# Patient Record
Sex: FEMALE | HISPANIC OR LATINO | Employment: FULL TIME | URBAN - METROPOLITAN AREA
[De-identification: names, ages, dates, MRNs, and addresses within clinical notes are randomized per-mention and may not be internally consistent; named-entity substitution may affect disease eponyms.]

---

## 2021-10-22 ENCOUNTER — OFFICE VISIT (OUTPATIENT)
Dept: OBSTETRICS AND GYNECOLOGY | Facility: CLINIC | Age: 36
End: 2021-10-22

## 2021-10-22 VITALS
HEIGHT: 64 IN | WEIGHT: 157 LBS | SYSTOLIC BLOOD PRESSURE: 115 MMHG | BODY MASS INDEX: 26.8 KG/M2 | DIASTOLIC BLOOD PRESSURE: 73 MMHG

## 2021-10-22 DIAGNOSIS — N89.8 VAGINAL IRRITATION: Primary | ICD-10-CM

## 2021-10-22 DIAGNOSIS — Z23 NEED FOR HPV VACCINE: ICD-10-CM

## 2021-10-22 PROBLEM — E03.9 HYPOTHYROIDISM: Status: ACTIVE | Noted: 2019-11-21

## 2021-10-22 PROCEDURE — 90471 IMMUNIZATION ADMIN: CPT | Performed by: OBSTETRICS & GYNECOLOGY

## 2021-10-22 PROCEDURE — 90651 9VHPV VACCINE 2/3 DOSE IM: CPT | Performed by: OBSTETRICS & GYNECOLOGY

## 2021-10-22 PROCEDURE — 99203 OFFICE O/P NEW LOW 30 MIN: CPT | Performed by: OBSTETRICS & GYNECOLOGY

## 2021-10-22 RX ORDER — LEVOTHYROXINE SODIUM 112 UG/1
112 TABLET ORAL
COMMUNITY
Start: 2021-07-23 | End: 2021-12-09 | Stop reason: SDUPTHER

## 2021-10-22 RX ORDER — TRIAMCINOLONE ACETONIDE 1 MG/G
CREAM TOPICAL
COMMUNITY
Start: 2021-05-20

## 2021-10-22 RX ORDER — SPIRONOLACTONE 100 MG/1
TABLET, FILM COATED ORAL
COMMUNITY
Start: 2021-09-30

## 2021-10-22 RX ORDER — CLINDAMYCIN PHOSPHATE 10 UG/ML
LOTION TOPICAL
COMMUNITY
Start: 2021-09-17

## 2021-10-22 NOTE — PROGRESS NOTES
SUBJECTIVE:   Chief Complaint   Patient presents with   • Gynecologic Exam     New gyn, Pt states previous yeast infection, no longer having symtoms of burning or irritation, but now having dryness also dryness during intercourse         Yee Arnett is a 35 y.o.  who presents for vaginal irritation.  She reports that a couple weeks ago she treated herself for what she thought was a yeast infection with a 1 day Monistat.  Since then, she felt some irritation which she thinks may have been because the medication is too strong.  She saw her doctor a couple weeks ago and had negative testing.  She denies any new sexual partners.  Reports a history of a conization in [place] 12 to 13 years ago; thinks it was before her first pregnancy.  She reports a normal Pap smear in 2019 which she thinks was at [facility].  Denies any current vaginal discharge or irritation.  Denies any dysuria.    Reports her periods are more or less normal but sometimes irregular due to her thyroid disease. Tubal ligation for contraception    Patient was offered interpretor, but declined at this visit. She was counseled that she can notify us at any time if she prefers an interpretor and expressed agreement and understanding    Desires Gardasil vaccination      Past Medical History:   Diagnosis Date   • Hypothyroidism      Past Surgical History:   Procedure Laterality Date   • BLADDER SUSPENSION     •  SECTION       OB History    Para Term  AB Living   2 2 2         SAB IAB Ectopic Molar Multiple Live Births             2      # Outcome Date GA Lbr Freddie/2nd Weight Sex Delivery Anes PTL Lv   2 Term            1 Term               Social History     Tobacco Use   • Smoking status: Never Smoker   • Smokeless tobacco: Not on file   Substance Use Topics   • Alcohol use: Never   • Drug use: Never     Family History   Problem Relation Age of Onset   • Pancreatic cancer Mother    • Breast cancer Maternal  "Grandmother    • Breast cancer Maternal Aunt    • Colon cancer Maternal Uncle    • Ovarian cancer Maternal Aunt      Current Outpatient Medications on File Prior to Visit   Medication Sig Dispense Refill   • clindamycin (CLEOCIN T) 1 % lotion      • levothyroxine (SYNTHROID, LEVOTHROID) 112 MCG tablet Take 112 mcg by mouth.     • spironolactone (ALDACTONE) 100 MG tablet      • triamcinolone (KENALOG) 0.1 % cream APPLY CREAM EXTERNALLY TO AFFECTED AREA AT BEDTIME FOR 4 WEEKS THEN STOP FOR TWO WEEKS. MAY REPEAT. MIX WITH UREA.       No current facility-administered medications on file prior to visit.     No Known Allergies     Review of Systems  See HPI    OBJECTIVE:   Vitals:    10/22/21 0842   BP: 115/73   Weight: 71.2 kg (157 lb)   Height: 162.6 cm (64\")      Physical Exam  Exam conducted with a chaperone present.   Constitutional:       Appearance: She is well-developed.   HENT:      Head: Normocephalic and atraumatic.   Eyes:      General: No scleral icterus.  Cardiovascular:      Rate and Rhythm: Normal rate.   Pulmonary:      Effort: Pulmonary effort is normal. No respiratory distress.   Abdominal:      General: There is no distension.      Palpations: Abdomen is soft.      Tenderness: There is no abdominal tenderness. There is no guarding.   Genitourinary:     Labia:         Right: No rash, tenderness or lesion.         Left: No rash, tenderness or lesion.       Vagina: No vaginal discharge, bleeding or lesions.      Cervix: No cervical motion tenderness or friability.      Uterus: Normal. Not enlarged and not tender.       Adnexa: Right adnexa normal.        Right: No mass or tenderness.          Left: No mass or tenderness.     Musculoskeletal:      Cervical back: Normal range of motion.   Skin:     General: Skin is warm and dry.   Neurological:      Mental Status: She is alert and oriented to person, place, and time.   Psychiatric:         Behavior: Behavior normal.         ASSESSMENT/PLAN:     ICD-10-CM " ICD-9-CM   1. Vaginal irritation  N89.8 623.9   2. Need for HPV vaccine  Z23 V04.89       Point-of-care wet prep nondiagnostic, will send swab.  While awaiting testing, recommended vaginal moisturizers such as Replens or Franca to see if this improves symptoms is no obvious signs of infection at this time.  Discussed risks and benefits of HPV vaccine.  We discussed that it is approved for women ages 9 through 45.  She would like to receive this.  Will receive first vaccine today.  Recommended second dose in 2 months and third dose 4 months from second.       Orders Placed This Encounter   Procedures   • NuSwab BV & Candida - Swab, Vagina     Order Specific Question:   Release to patient     Answer:   Immediate   • HPV Vaccine       Return in about 1 month (around 11/22/2021) for Annual physical.

## 2021-10-26 LAB
A VAGINAE DNA VAG QL NAA+PROBE: NORMAL SCORE
BVAB2 DNA VAG QL NAA+PROBE: NORMAL SCORE
C ALBICANS DNA VAG QL NAA+PROBE: NEGATIVE
C GLABRATA DNA VAG QL NAA+PROBE: NEGATIVE
MEGA1 DNA VAG QL NAA+PROBE: NORMAL SCORE

## 2021-11-19 ENCOUNTER — TELEPHONE (OUTPATIENT)
Dept: OBSTETRICS AND GYNECOLOGY | Facility: CLINIC | Age: 36
End: 2021-11-19

## 2021-11-19 NOTE — TELEPHONE ENCOUNTER
----- Message from Juana Ibarra MD sent at 10/27/2021  8:08 AM EDT -----  Please call patient and let her know that her test results were normal.  I had recommended vaginal moisturizers for treatment (such as Luvena or Replens).  If she has any questions, I am happy to answer them. Thanks!    -pt is aware of negative results and name of treatments were given.

## 2021-12-09 ENCOUNTER — CLINICAL SUPPORT (OUTPATIENT)
Dept: OBSTETRICS AND GYNECOLOGY | Facility: CLINIC | Age: 36
End: 2021-12-09

## 2021-12-09 VITALS
DIASTOLIC BLOOD PRESSURE: 68 MMHG | HEIGHT: 64 IN | WEIGHT: 159 LBS | HEART RATE: 74 BPM | SYSTOLIC BLOOD PRESSURE: 106 MMHG | BODY MASS INDEX: 27.14 KG/M2

## 2021-12-09 DIAGNOSIS — Z23 NEED FOR HPV VACCINE: Primary | ICD-10-CM

## 2021-12-09 PROCEDURE — 90651 9VHPV VACCINE 2/3 DOSE IM: CPT | Performed by: OBSTETRICS & GYNECOLOGY

## 2021-12-09 PROCEDURE — 90471 IMMUNIZATION ADMIN: CPT | Performed by: OBSTETRICS & GYNECOLOGY

## 2021-12-09 RX ORDER — LEVOTHYROXINE SODIUM 112 UG/1
112 TABLET ORAL
COMMUNITY
Start: 2021-11-19 | End: 2022-05-18

## 2021-12-09 NOTE — PROGRESS NOTES
Subjective   Yee Arnett is a 36 y.o. female. Here for second gardasil injection. Office supplied injection. Lot # [id number] exp 8/15/2023 [id number]/01. Pt did not have a reaction    History of Present Illness        Review of Systems    Objective   Physical Exam    Assessment/Plan   There are no diagnoses linked to this encounter.

## 2022-01-14 ENCOUNTER — OFFICE VISIT (OUTPATIENT)
Dept: OBSTETRICS AND GYNECOLOGY | Facility: CLINIC | Age: 37
End: 2022-01-14

## 2022-01-14 ENCOUNTER — PATIENT MESSAGE (OUTPATIENT)
Dept: OBSTETRICS AND GYNECOLOGY | Facility: CLINIC | Age: 37
End: 2022-01-14

## 2022-01-14 VITALS
DIASTOLIC BLOOD PRESSURE: 72 MMHG | HEART RATE: 84 BPM | BODY MASS INDEX: 28.17 KG/M2 | SYSTOLIC BLOOD PRESSURE: 117 MMHG | HEIGHT: 64 IN | WEIGHT: 165 LBS

## 2022-01-14 DIAGNOSIS — N39.3 STRESS INCONTINENCE IN FEMALE: ICD-10-CM

## 2022-01-14 DIAGNOSIS — Z98.890 HISTORY OF SURGERY: ICD-10-CM

## 2022-01-14 DIAGNOSIS — Z01.419 WELL WOMAN EXAM WITH ROUTINE GYNECOLOGICAL EXAM: Primary | ICD-10-CM

## 2022-01-14 PROCEDURE — 99395 PREV VISIT EST AGE 18-39: CPT | Performed by: OBSTETRICS & GYNECOLOGY

## 2022-01-14 NOTE — PROGRESS NOTES
"Chief Complaint   Patient presents with   • Gynecologic Exam     Here for annual exam , last pap 2020        Yee Arnett is a 36 y.o.  who presents for an annual examination   Reports some increased urinary incontinence, mostly with stress. She had a surgery in 2016 in [place] with mesh. Will request records    Pap history:  Last pap:  NIL, HPV negative ([facility])  Prior abnormal paps: no  STDs  Sexually active: yes  History of STDs: no  Has had HPV vaccine: Yes, 2 of 3 doses  Contraception:  Tubal ligation- periods are a bit heavier since tubal. 4-5 days, second day changes pad about 5 times. Some small clots    Screening for BRCA-   Is patient's family history significant for BRCA risk factors? Difficult to discern. Reports in [place] was told \"cancer interior\" - difficult to say what exactly was primary    Past Medical History:   Diagnosis Date   • Hypothyroidism      Past Surgical History:   Procedure Laterality Date   • BLADDER SUSPENSION      [facility]   •  SECTION     • TUBAL ABDOMINAL LIGATION       OB History    Para Term  AB Living   2 2 2     2   SAB IAB Ectopic Molar Multiple Live Births             2      # Outcome Date GA Lbr Freddie/2nd Weight Sex Delivery Anes PTL Lv   2 Term 2018 38w0d  4309 g (9 lb 8 oz) F CS-Unspec   ANDRADE   1 Term  42w0d  2892 g (6 lb 6 oz) M    ANDRADE      Social History     Tobacco Use   • Smoking status: Never Smoker   • Smokeless tobacco: Never Used   Substance Use Topics   • Alcohol use: Never   • Drug use: Never     Family History   Problem Relation Age of Onset   • Pancreatic cancer Mother    • Breast cancer Maternal Grandmother 70   • Breast cancer Maternal Aunt    • Ovarian cancer Maternal Aunt    • Colon cancer Neg Hx    • Deep vein thrombosis Neg Hx    • Pulmonary embolism Neg Hx      Current Outpatient Medications on File Prior to Visit   Medication Sig Dispense Refill   • clindamycin (CLEOCIN T) 1 % lotion      • " "levothyroxine (SYNTHROID, LEVOTHROID) 112 MCG tablet Take 112 mcg by mouth.     • spironolactone (ALDACTONE) 100 MG tablet      • triamcinolone (KENALOG) 0.1 % cream APPLY CREAM EXTERNALLY TO AFFECTED AREA AT BEDTIME FOR 4 WEEKS THEN STOP FOR TWO WEEKS. MAY REPEAT. MIX WITH UREA.       No current facility-administered medications on file prior to visit.     No Known Allergies     Review of Systems  Se HPI    OBJECTIVE:   Vitals:    01/14/22 0859   BP: 117/72   Pulse: 84   Weight: 74.8 kg (165 lb)   Height: 162.6 cm (64\")      Physical Exam  Exam conducted with a chaperone present.   Constitutional:       General: She is not in acute distress.     Appearance: She is well-developed. She is not diaphoretic.   HENT:      Head: Normocephalic and atraumatic.   Neck:      Thyroid: No thyromegaly.      Trachea: No tracheal deviation.   Cardiovascular:      Rate and Rhythm: Normal rate.      Heart sounds: Normal heart sounds. No murmur heard.  No friction rub. No gallop.    Pulmonary:      Effort: Pulmonary effort is normal. No respiratory distress.      Breath sounds: Normal breath sounds.   Chest:      Chest wall: No tenderness.   Breasts:      Right: No inverted nipple, mass, nipple discharge, skin change or tenderness.      Left: No inverted nipple, mass, nipple discharge, skin change or tenderness.       Abdominal:      General: There is no distension.      Palpations: Abdomen is soft. There is no mass.      Tenderness: There is no abdominal tenderness.   Genitourinary:     General: Normal vulva.      Labia:         Right: No rash, lesion or injury.         Left: No rash, lesion or injury.       Vagina: No vaginal discharge, tenderness or bleeding.      Cervix: No cervical motion tenderness, discharge or friability.      Uterus: Not deviated, not enlarged, not fixed and not tender.       Adnexa:         Right: No mass, tenderness or fullness.          Left: No mass, tenderness or fullness.     Musculoskeletal:         " General: No deformity. Normal range of motion.   Lymphadenopathy:      Cervical: No cervical adenopathy.   Skin:     General: Skin is warm and dry.      Findings: No rash.   Neurological:      Mental Status: She is alert and oriented to person, place, and time.   Psychiatric:         Behavior: Behavior normal.         Thought Content: Thought content normal.         Judgment: Judgment normal.         ASSESSMENT/PLAN:     Annual well woman exam:  Cervical cancer screening:    Denies cervical dysplasia in past   HPV vaccination 2 of 3 doses   The patient is due for a pap in 2025.    Screening guidelines discussed with patient  Breast cancer screening:    Clinical breast exam recommended for age 20-39 years every 1-3 years   Mammogram recommended starting age 40    Breast self awareness encouraged  STD Screening   Testing declined.    Contraception :   S/p BTL. Discussed use of NSAIDs PRN for bleeding. Follow up if worsening    Stress incontinence:   Will request records from 2016 surgery and refer to urogyn. Mesh appears intact, no sign of erosion. Bladder well supported    BMI Counseling  Body mass index is 28.32 kg/m².       Return in about 1 year (around 1/14/2023) for Annual physical.

## 2022-01-17 ENCOUNTER — TELEPHONE (OUTPATIENT)
Dept: OBSTETRICS AND GYNECOLOGY | Facility: CLINIC | Age: 37
End: 2022-01-17

## 2022-01-18 ENCOUNTER — TELEPHONE (OUTPATIENT)
Dept: OBSTETRICS AND GYNECOLOGY | Facility: CLINIC | Age: 37
End: 2022-01-18

## 2022-01-18 NOTE — TELEPHONE ENCOUNTER
----- Message from Juana Ibarra MD sent at 1/14/2022  9:48 AM EST -----  Hi I'm requesting records from surgery in [place] and then can we sent to Urogyn? Thanks!

## 2022-01-18 NOTE — TELEPHONE ENCOUNTER
Referral and records faxed to [facility] for review.  They will call pt to schedule.     Pt # [phone]

## 2022-01-18 NOTE — TELEPHONE ENCOUNTER
Joseph kahn, referral faxed to [facility].  Their office informed me they would call pt to schedule

## 2024-05-17 ENCOUNTER — OFFICE VISIT (OUTPATIENT)
Dept: OBSTETRICS AND GYNECOLOGY | Facility: CLINIC | Age: 39
End: 2024-05-17
Payer: COMMERCIAL

## 2024-05-17 VITALS
SYSTOLIC BLOOD PRESSURE: 129 MMHG | BODY MASS INDEX: 27.66 KG/M2 | HEART RATE: 72 BPM | HEIGHT: 64 IN | DIASTOLIC BLOOD PRESSURE: 83 MMHG | WEIGHT: 162 LBS

## 2024-05-17 DIAGNOSIS — Z01.419 PAP TEST, AS PART OF ROUTINE GYNECOLOGICAL EXAMINATION: ICD-10-CM

## 2024-05-17 DIAGNOSIS — Z01.419 WELL WOMAN EXAM WITH ROUTINE GYNECOLOGICAL EXAM: Primary | ICD-10-CM

## 2024-05-17 RX ORDER — BACITRACIN ZINC 500 [USP'U]/G
OINTMENT TOPICAL
COMMUNITY

## 2024-05-17 RX ORDER — MELOXICAM 7.5 MG/1
7.5 TABLET ORAL DAILY
COMMUNITY
Start: 2024-04-08 | End: 2024-07-07

## 2024-05-17 RX ORDER — NAPROXEN 500 MG/1
500 TABLET ORAL
COMMUNITY
Start: 2024-03-13 | End: 2024-06-11

## 2024-05-17 NOTE — PROGRESS NOTES
"Chief Complaint   Patient presents with    Well woman exam with routine gynecological exam        Yee Arnett is a 38 y.o.  who presents for an annual examination   Urinary incontinence is improved; in  had surgery with Urogyn    Pap history:  Last pap:  NIL, HPV negative ([facility])  Prior abnormal paps: no  STDs  Sexually active: yes  History of STDs: no  Has had HPV vaccine: Yes, 2 of 3 doses  Contraception:  Tubal ligation- periods are a bit heavier since tubal. 4-5 days, second day changes pad about 5 times. Some small clots    Screening for BRCA-   Is patient's family history significant for BRCA risk factors? Difficult to discern. Reports in [place] was told \"cancer interior\" - difficult to say what exactly was primary    Past Medical History:   Diagnosis Date    Hypothyroidism      Past Surgical History:   Procedure Laterality Date    BLADDER SUSPENSION      [facility]     SECTION      TUBAL ABDOMINAL LIGATION       OB History    Para Term  AB Living   2 2 2     2   SAB IAB Ectopic Molar Multiple Live Births             2      # Outcome Date GA Lbr Freddie/2nd Weight Sex Type Anes PTL Lv   2 Term  38w0d  4309 g (9 lb 8 oz) F CS-Unspec   ANDRADE   1 Term  42w0d  2892 g (6 lb 6 oz) M    ANDRADE      Social History     Tobacco Use    Smoking status: Never    Smokeless tobacco: Never   Substance Use Topics    Alcohol use: Never    Drug use: Never     Family History   Problem Relation Age of Onset    Pancreatic cancer Mother     Breast cancer Maternal Grandmother 70    Breast cancer Maternal Aunt     Ovarian cancer Maternal Aunt     Colon cancer Neg Hx     Deep vein thrombosis Neg Hx     Pulmonary embolism Neg Hx      Current Outpatient Medications on File Prior to Visit   Medication Sig Dispense Refill    clindamycin (CLEOCIN T) 1 % lotion       Diclofenac Sodium (VOLTAREN) 1 % gel gel Apply 1 g topically to the appropriate area as directed.      EQ " "Bacitracin Zinc 500 UNIT/GM ointment APPLY TO AFFECTED AREA TOPICALLY TWICE DAILY      hydrocortisone 2.5 % cream Apply  topically to the appropriate area as directed.      levothyroxine (SYNTHROID, LEVOTHROID) 112 MCG tablet Take 1 tablet by mouth.      meloxicam (MOBIC) 7.5 MG tablet Take 1 tablet by mouth Daily.      naproxen (NAPROSYN) 500 MG tablet Take 1 tablet by mouth.      spironolactone (ALDACTONE) 100 MG tablet       triamcinolone (KENALOG) 0.1 % cream APPLY CREAM EXTERNALLY TO AFFECTED AREA AT BEDTIME FOR 4 WEEKS THEN STOP FOR TWO WEEKS. MAY REPEAT. MIX WITH UREA.       No current facility-administered medications on file prior to visit.     No Known Allergies     Review of Systems  Se HPI    OBJECTIVE:   Vitals:    05/17/24 1427   BP: 129/83   Pulse: 72   Weight: 73.5 kg (162 lb)   Height: 162.6 cm (64.02\")      Physical Exam  Exam conducted with a chaperone present.   Constitutional:       General: She is not in acute distress.     Appearance: She is well-developed. She is not diaphoretic.   HENT:      Head: Normocephalic and atraumatic.   Neck:      Thyroid: No thyromegaly.      Trachea: No tracheal deviation.   Cardiovascular:      Rate and Rhythm: Normal rate.      Heart sounds: Normal heart sounds. No murmur heard.     No friction rub. No gallop.   Pulmonary:      Effort: Pulmonary effort is normal. No respiratory distress.      Breath sounds: Normal breath sounds.   Chest:      Chest wall: No tenderness.   Breasts:     Right: No inverted nipple, mass, nipple discharge, skin change or tenderness.      Left: No inverted nipple, mass, nipple discharge, skin change or tenderness.   Abdominal:      General: There is no distension.      Palpations: Abdomen is soft. There is no mass.      Tenderness: There is no abdominal tenderness.   Genitourinary:     General: Normal vulva.      Labia:         Right: No rash, lesion or injury.         Left: No rash, lesion or injury.       Vagina: No vaginal " discharge, tenderness or bleeding.      Cervix: No cervical motion tenderness, discharge or friability.      Uterus: Not deviated, not enlarged, not fixed and not tender.       Adnexa:         Right: No mass, tenderness or fullness.          Left: No mass, tenderness or fullness.     Musculoskeletal:         General: No deformity. Normal range of motion.   Lymphadenopathy:      Cervical: No cervical adenopathy.   Skin:     General: Skin is warm and dry.      Findings: No rash.   Neurological:      Mental Status: She is alert and oriented to person, place, and time.   Psychiatric:         Behavior: Behavior normal.         Thought Content: Thought content normal.         Judgment: Judgment normal.         ASSESSMENT/PLAN:     Annual well woman exam:  Cervical cancer screening:    Denies cervical dysplasia in past   HPV vaccination 2 of 3 doses. Desires third dose today.   The patient is due for a pap today   Screening guidelines discussed with patient  Breast cancer screening:    Clinical breast exam recommended for age 20-39 years every 1-3 years   Mammogram recommended starting age 40    Breast self awareness encouraged  STD Screening   Testing declined.    Contraception :   S/p BTL.    Stress incontinence:   Improved, s/p Urogyn consultation and surgery.    BMI Counseling  Body mass index is 27.79 kg/m².       No follow-ups on file.

## 2024-05-23 LAB
CYTOLOGIST CVX/VAG CYTO: NORMAL
CYTOLOGY CVX/VAG DOC CYTO: NORMAL
CYTOLOGY CVX/VAG DOC THIN PREP: NORMAL
DX ICD CODE: NORMAL
HPV I/H RISK 4 DNA CVX QL PROBE+SIG AMP: NEGATIVE
Lab: NORMAL
Lab: NORMAL
OTHER STN SPEC: NORMAL
STAT OF ADQ CVX/VAG CYTO-IMP: NORMAL

## 2025-06-06 ENCOUNTER — OFFICE VISIT (OUTPATIENT)
Dept: OBSTETRICS AND GYNECOLOGY | Facility: CLINIC | Age: 40
End: 2025-06-06
Payer: COMMERCIAL

## 2025-06-06 VITALS
WEIGHT: 174.6 LBS | BODY MASS INDEX: 29.81 KG/M2 | DIASTOLIC BLOOD PRESSURE: 79 MMHG | HEIGHT: 64 IN | SYSTOLIC BLOOD PRESSURE: 120 MMHG

## 2025-06-06 DIAGNOSIS — Z80.3 FAMILY HISTORY OF BREAST CANCER: ICD-10-CM

## 2025-06-06 DIAGNOSIS — Z80.41 FAMILY HISTORY OF OVARIAN CANCER: ICD-10-CM

## 2025-06-06 DIAGNOSIS — Z01.419 ENCOUNTER FOR GYNECOLOGICAL EXAMINATION WITHOUT ABNORMAL FINDING: Primary | ICD-10-CM

## 2025-06-08 NOTE — PATIENT INSTRUCTIONS
[facility] OVARIAN CANC[facility]ENING [facility]GRAM    The Ovarian Cancer Screening Program at [facility] is a clinical research study. The [facility] Ovarian Cancer Screening Program provides free annual sonographic screenings to women across [place] with the goal of detecting cancer early. When it’s caught early, ovarian cancer is a treatable and curable disease.      SCREENING ELIGIBILITY    All women over the age of 50 (including those who have no symptoms and no personal history of ovarian cancer) are eligible for a free ovarian cancer screening.  Women over the age of 25 who have a family history of ovarian cancer are also eligible for a free screening.  Any woman in one of these two groups should contact us at [phone] to schedule an appointment.      HOURS AND CONTACT INFORMATION    Hours of Operation:  Monday - Friday: 8 a.m. - 4:30 p.m.  To schedule an appointment, please consider one of the following points of contact:    Call:   [phone]  [phone]    Please plan on scheduling your screening appointments two or more months in advance.

## 2025-06-11 LAB
CYTOLOGIST CVX/VAG CYTO: NORMAL
CYTOLOGY CVX/VAG DOC CYTO: NORMAL
CYTOLOGY CVX/VAG DOC THIN PREP: NORMAL
DX ICD CODE: NORMAL
HPV I/H RISK 4 DNA CVX QL PROBE+SIG AMP: NEGATIVE
OTHER STN SPEC: NORMAL
SERVICE CMNT-IMP: NORMAL
STAT OF ADQ CVX/VAG CYTO-IMP: NORMAL